# Patient Record
Sex: FEMALE | Race: OTHER | HISPANIC OR LATINO | URBAN - METROPOLITAN AREA
[De-identification: names, ages, dates, MRNs, and addresses within clinical notes are randomized per-mention and may not be internally consistent; named-entity substitution may affect disease eponyms.]

---

## 2023-08-15 ENCOUNTER — APPOINTMENT (RX ONLY)
Dept: URBAN - METROPOLITAN AREA CLINIC 94 | Facility: CLINIC | Age: 53
Setting detail: DERMATOLOGY
End: 2023-08-15

## 2023-08-15 DIAGNOSIS — I87.2 VENOUS INSUFFICIENCY (CHRONIC) (PERIPHERAL): ICD-10-CM

## 2023-08-15 PROCEDURE — ? MEDICAL CONSULTATION: VENOUS DISEASE

## 2023-08-15 PROCEDURE — 99203 OFFICE O/P NEW LOW 30 MIN: CPT

## 2023-08-15 NOTE — HPI: VEIN EVALUATION
Do You Have A Family History Of Vein Disease?: no
How Severe Is/Are Your Symptoms?: moderate
Is This A New Presentation, Or A Follow-Up?: Lower Extremity Pain and Swelling
Referred By:: Advertisement

## 2023-08-15 NOTE — PROCEDURE: MEDICAL CONSULTATION: VENOUS DISEASE
Left Leg: Peripheral Vascular Disease?: No
Right Leg Circumference: medium
Left Leg Ulcer Duration: 0- None
Follow Up Instructions:: The patient must wear compression stockings. Preventive strategies include weight loss through diet and exercise and toning leg muscles. A venous fact sheet was given, which reviews venous anatomy/pathophysiology and treatment options. The pathophysiology of venous disease and potential treatment options were discussed in detail, especially the non-FDA status of foam sclerotherapy with its risks benefits and alternatives. The patient's questions were answered in full.
Detail Level: Simple
Other Instructions:: Follow up with PCP
Left Leg Venous Edema: 1- Evening ankle edema only
Right Leg Venous Hyperpigmentation: 0- None or focal low intensity (tan)
Right Leg Compression Therapy: 0- None or noncompliant
Right Leg Pain: 2- Daily, moderate activity limitation, occasional analgesics
Include Ceap In The Note?: Yes
Right Leg Varicose Veins: 2- Multiple: GS varicose veins confined to calf or thigh
Right Dorsalis Pedis Pulse: 2 (Easily palpable)

## 2023-08-29 ENCOUNTER — APPOINTMENT (RX ONLY)
Dept: URBAN - METROPOLITAN AREA CLINIC 102 | Facility: CLINIC | Age: 53
Setting detail: DERMATOLOGY
End: 2023-08-29

## 2023-08-29 DIAGNOSIS — I87.2 VENOUS INSUFFICIENCY (CHRONIC) (PERIPHERAL): ICD-10-CM

## 2023-08-29 PROCEDURE — ? LOWER EXTREMITY DOPPLER US

## 2023-08-29 PROCEDURE — 93970 EXTREMITY STUDY: CPT

## 2023-08-29 NOTE — PROCEDURE: LOWER EXTREMITY DOPPLER US
Right Intraluminal Thrombus- Yes: The right deep veins were imaged from the level of the common femoral vein to the posterior tibial veins. There was evidence of intraluminal thrombus as noted above.
Detail Level: Detailed
Reflux Options: Use Range
Left Intraluminal Thrombus- No: The left deep veins were imaged from the level of the common femoral vein to the posterior tibial veins. All deep veins demonstrated compressibility without evidence of intraluminal thrombus.
See Attached Documentation Text: Please refer to the attached ultrasound documentation for complete details of the procedure and the venous findings.
Recommend Sclerotherapy With Ultrasound Guidance On Left Side: No
Continue Conservative Therapy: Yes
Left Intraluminal Thrombus- Yes: The left deep veins were imaged from the level of the common femoral vein to the posterior tibial veins. There was evidence of intraluminal thrombus as noted above.
Right Intraluminal Thrombus- No: The right deep veins were imaged from the level of the common femoral vein to the posterior tibial veins. All deep veins demonstrated compressibility without evidence of intraluminal thrombus.
Continue Conservative Therapy Text: Continue conservative treatment (such as compression stockings, OTC analgesics, and exercise) and consider intervention if no change or worsening symptoms to varicosities.
Comments: See attachment.

## 2023-09-12 ENCOUNTER — APPOINTMENT (RX ONLY)
Dept: URBAN - METROPOLITAN AREA CLINIC 94 | Facility: CLINIC | Age: 53
Setting detail: DERMATOLOGY
End: 2023-09-12

## 2023-09-12 DIAGNOSIS — I87.2 VENOUS INSUFFICIENCY (CHRONIC) (PERIPHERAL): ICD-10-CM

## 2023-09-12 PROCEDURE — ? MEDICAL CONSULTATION: VENOUS DISEASE

## 2023-09-12 PROCEDURE — 99212 OFFICE O/P EST SF 10 MIN: CPT

## 2023-09-12 NOTE — PROCEDURE: MEDICAL CONSULTATION: VENOUS DISEASE
Include Left Vcss In Note: Yes
Left Leg Compression Therapy: 0- None or noncompliant
Right Leg Induration: 0- None
Left Dorsalis Pedis Pulse: 2 (Easily palpable)
Detail Level: Detailed
Right Leg Varicose Veins: 2- Multiple: GS varicose veins confined to calf or thigh
Left Leg Pain: 2- Daily, moderate activity limitation, occasional analgesics
Right Leg: Peripheral Vascular Disease?: No
Right Leg Venous Edema: 1- Evening ankle edema only
Right Leg Circumference: medium
Right Leg Venous Hyperpigmentation: 0- None or focal low intensity (tan)
Follow Up Instructions:: Patient will follow up after the bilateral duplex ultrasound venous reflux study to review the results and finalize treatment plan. The patient must wear compression stockings. Preventive strategies include weight loss through diet and exercise and toning leg muscles. A venous fact sheet was given, which reviews venous anatomy/pathophysiology and treatment options. The pathophysiology of venous disease and potential treatment options were discussed in detail, especially the non-FDA status of foam sclerotherapy with its risks benefits and alternatives. The patient's questions were answered in full.
Other Plan: 3 month compression use

## 2023-09-12 NOTE — HPI: VEIN EVALUATION
Which Leg Is Worse?: Equally Affected
How Severe Is/Are Your Symptoms?: moderate
Is This A New Presentation, Or A Follow-Up?: Follow Up Venous Evaluation

## 2023-12-12 ENCOUNTER — APPOINTMENT (RX ONLY)
Dept: URBAN - METROPOLITAN AREA CLINIC 94 | Facility: CLINIC | Age: 53
Setting detail: DERMATOLOGY
End: 2023-12-12

## 2023-12-12 DIAGNOSIS — I87.2 VENOUS INSUFFICIENCY (CHRONIC) (PERIPHERAL): ICD-10-CM

## 2023-12-12 PROCEDURE — ? VEIN TREATMENT PLAN

## 2023-12-12 PROCEDURE — ? CEAP CLASSIFICATION

## 2023-12-12 PROCEDURE — 99213 OFFICE O/P EST LOW 20 MIN: CPT

## 2023-12-12 PROCEDURE — ? VENOUS CLINICAL SEVERITY SCORE

## 2023-12-12 NOTE — PROCEDURE: VENOUS CLINICAL SEVERITY SCORE
Left Leg Venous Edema: 1- Evening ankle edema only
Left Leg Varicose Veins: 1- Few, scattered: branch varicose veins
Right Leg Compression Therapy: 2- Wears elastic stocking most days
Right Leg Pigmentation: 0- None or focal low intensity (tan)
Right Leg Ulcer Diameter: 0- None
Right Leg Pain: 2- Daily, moderate activity limitation, occasional analgesics
Include Right Vcss In Note: Yes
Detail Level: Simple

## 2023-12-12 NOTE — PROCEDURE: VEIN TREATMENT PLAN
Detail Level: Simple
Closing Statement (Will Not Render If Left Blank): We discussed all treatment options. Risks and benefits of each procedure were discussed. These include but are not limited to: deep vein thrombosis, pulmonary embolism, paresthesia, phlebitis, infection, bleeding, and visible scarring. After the risks and benefits were reviewed with the patient and all of their questions were answered they decided to move forward with treatment. A patient education booklet was given and pre/post procedure instructions were reviewed with the patient.
[place] Sessions - Left: 2
Intro Statement (Will Not Render If Left Blank): Extensive discussion and education was undertaken during the office visit regarding pathophysiology, chronicity and long term prognosis of venous disease. We also discussed risk factors for venous hypertension, diagnostic testing and treatment. Our treatment discussion included conservative management and interventional procedure options with an in-depth explanation of the procedures, recommendations and expected follow-up. All questions were answered and no further questions were verbalized by the patient at this time.
[place] Sessions - Right: 1
Symptom Statement (Will Not Render If Left Blank): The patient has signs and symptoms of chronic venous hypertension affecting daily function with US of the lower extremities demonstrating venous insufficiency. The patient has failed conservative treatment including avoiding prolonged standing, leg elevation, analgesis, exercise, weight management and graduated compression stockings (20-30mmHg or higher).